# Patient Record
Sex: MALE | Race: WHITE | NOT HISPANIC OR LATINO | URBAN - METROPOLITAN AREA
[De-identification: names, ages, dates, MRNs, and addresses within clinical notes are randomized per-mention and may not be internally consistent; named-entity substitution may affect disease eponyms.]

---

## 2019-04-16 PROBLEM — 266435005 GASTRO-ESOPHAGEAL REFLUX DISEASE WITHOUT ESOPHAGITIS: Status: ACTIVE | Noted: 2019-04-16

## 2019-04-16 PROBLEM — 238131007 OVERWEIGHT: Status: ACTIVE | Noted: 2019-04-16

## 2019-04-16 PROBLEM — 68496003 POLYP OF COLON: Status: ACTIVE | Noted: 2019-04-16

## 2022-04-30 ENCOUNTER — TELEPHONE ENCOUNTER (OUTPATIENT)
Dept: URBAN - METROPOLITAN AREA CLINIC 121 | Facility: CLINIC | Age: 72
End: 2022-04-30

## 2022-05-01 ENCOUNTER — TELEPHONE ENCOUNTER (OUTPATIENT)
Dept: URBAN - METROPOLITAN AREA CLINIC 121 | Facility: CLINIC | Age: 72
End: 2022-05-01

## 2022-05-01 RX ORDER — SILDENAFIL CITRATE 20 MG/1
TABLET, FILM COATED ORAL
Status: ACTIVE | COMMUNITY
Start: 2019-05-03

## 2022-05-01 RX ORDER — TEA TREE OIL 100 %
SPRAY, NON-AEROSOL (ML) TOPICAL
Status: ACTIVE | COMMUNITY
Start: 2019-04-16

## 2022-05-01 RX ORDER — FAMOTIDINE 10 MG/1
TABLET, FILM COATED ORAL
Status: ACTIVE | COMMUNITY
Start: 2019-04-16

## 2022-05-01 RX ORDER — DANTROLENE SODIUM 20 MG/60ML
INJECTION, POWDER, LYOPHILIZED, FOR SOLUTION INTRAVENOUS
Status: ACTIVE | COMMUNITY
Start: 2019-04-16

## 2022-05-01 RX ORDER — ASPIRIN 81 MG/1
TABLET, DELAYED RELEASE ORAL
Status: ACTIVE | COMMUNITY
Start: 2019-04-16

## 2022-05-01 RX ORDER — DIAZEPAM 10 MG/1
TABLET ORAL
Status: ACTIVE | COMMUNITY
Start: 2019-04-17

## 2024-11-06 ENCOUNTER — LAB OUTSIDE AN ENCOUNTER (OUTPATIENT)
Dept: URBAN - METROPOLITAN AREA CLINIC 27 | Facility: CLINIC | Age: 74
End: 2024-11-06

## 2024-11-06 ENCOUNTER — DASHBOARD ENCOUNTERS (OUTPATIENT)
Age: 74
End: 2024-11-06

## 2024-11-06 ENCOUNTER — OFFICE VISIT (OUTPATIENT)
Dept: URBAN - METROPOLITAN AREA CLINIC 27 | Facility: CLINIC | Age: 74
End: 2024-11-06
Payer: MEDICARE

## 2024-11-06 VITALS
SYSTOLIC BLOOD PRESSURE: 130 MMHG | DIASTOLIC BLOOD PRESSURE: 86 MMHG | HEIGHT: 71 IN | WEIGHT: 193 LBS | BODY MASS INDEX: 27.02 KG/M2 | HEART RATE: 64 BPM

## 2024-11-06 DIAGNOSIS — I10 HYPERTENSION, ESSENTIAL: ICD-10-CM

## 2024-11-06 DIAGNOSIS — R13.19 ESOPHAGEAL DYSPHAGIA: ICD-10-CM

## 2024-11-06 DIAGNOSIS — K63.5 POLYP OF COLON: ICD-10-CM

## 2024-11-06 DIAGNOSIS — K21.9 GASTRO-ESOPHAGEAL REFLUX DISEASE WITHOUT ESOPHAGITIS: ICD-10-CM

## 2024-11-06 PROCEDURE — 99244 OFF/OP CNSLTJ NEW/EST MOD 40: CPT | Performed by: INTERNAL MEDICINE

## 2024-11-06 RX ORDER — FAMOTIDINE 10 MG/1
TABLET, FILM COATED ORAL
Status: ACTIVE | COMMUNITY
Start: 2019-04-16

## 2024-11-06 RX ORDER — DIAZEPAM 10 MG/1
TABLET ORAL
Status: ACTIVE | COMMUNITY
Start: 2019-04-17

## 2024-11-06 RX ORDER — ASPIRIN 81 MG/1
TABLET, DELAYED RELEASE ORAL
Status: ACTIVE | COMMUNITY
Start: 2019-04-16

## 2024-11-06 RX ORDER — TEA TREE OIL 100 %
SPRAY, NON-AEROSOL (ML) TOPICAL
Status: ACTIVE | COMMUNITY
Start: 2019-04-16

## 2024-11-06 RX ORDER — DANTROLENE SODIUM 20 MG/60ML
INJECTION, POWDER, LYOPHILIZED, FOR SOLUTION INTRAVENOUS
Status: ACTIVE | COMMUNITY
Start: 2019-04-16

## 2024-11-06 RX ORDER — SILDENAFIL CITRATE 20 MG/1
TABLET, FILM COATED ORAL
Status: ACTIVE | COMMUNITY
Start: 2019-05-03

## 2024-11-06 NOTE — PHYSICAL EXAM GASTROINTESTINAL
soft, nontender, nondistended [Joint Pain] : joint pain [Joint Stiffness] : joint stiffness [Negative] : Heme/Lymph

## 2024-11-06 NOTE — HPI-TODAY'S VISIT:
This is a 74-year-old male seen in consultation today for colon cancer screening evaluation.  His last colonoscopy was 5 years ago.he is a history of colon polyps and diverticulosis.  He also complains of occasional reflux about 2 times a week uses Pepcid 2 times a week occasional dysphagia to chicken salad and chicken.  His weight is stable he otherwise feels well.  He began statins 1 year ago and he became a little more constipated he has a bowel movement every day or every other day.  It does not bother him only occasionally straining.  He takes sildenafil 3 times a day for hypertension as well as hydrochlorothiazide.  Otherwise no complaints. Referred by Dr Berry.

## 2024-11-18 ENCOUNTER — TELEPHONE ENCOUNTER (OUTPATIENT)
Dept: URBAN - METROPOLITAN AREA CLINIC 27 | Facility: CLINIC | Age: 74
End: 2024-11-18

## 2024-12-05 ENCOUNTER — OFFICE VISIT (OUTPATIENT)
Dept: URBAN - METROPOLITAN AREA SURGERY CENTER 7 | Facility: SURGERY CENTER | Age: 74
End: 2024-12-05
Payer: MEDICARE

## 2024-12-05 DIAGNOSIS — K64.8 INTERNAL HEMORRHOIDS: ICD-10-CM

## 2024-12-05 DIAGNOSIS — Z86.0100 PERSONAL HISTORY OF COLONIC POLYPS: ICD-10-CM

## 2024-12-05 DIAGNOSIS — Z09 ENCOUNTER FOR FOLLOW-UP: ICD-10-CM

## 2024-12-05 DIAGNOSIS — Z09 ENCNTR FOR F/U EXAM AFT TRTMT FOR COND OTH THAN MALIG NEOPLM: ICD-10-CM

## 2024-12-05 DIAGNOSIS — Z86.0101 PERSONAL HISTORY OF ADENOMATOUS AND SERRATED COLON POLYPS: ICD-10-CM

## 2024-12-05 DIAGNOSIS — K57.30 DIVERTICULOSIS OF SIGMOID COLON: ICD-10-CM

## 2024-12-05 PROCEDURE — 0529F INTRVL 3/>YR PTS CLNSCP DOCD: CPT | Performed by: CLINIC/CENTER

## 2024-12-05 PROCEDURE — 00812 ANES LWR INTST SCR COLSC: CPT | Performed by: NURSE ANESTHETIST, CERTIFIED REGISTERED

## 2024-12-05 PROCEDURE — G0105 COLORECTAL SCRN; HI RISK IND: HCPCS | Performed by: CLINIC/CENTER

## 2024-12-05 PROCEDURE — G0105 COLORECTAL SCRN; HI RISK IND: HCPCS | Performed by: INTERNAL MEDICINE

## 2024-12-05 PROCEDURE — 0529F INTRVL 3/>YR PTS CLNSCP DOCD: CPT | Performed by: INTERNAL MEDICINE

## 2024-12-05 RX ORDER — DANTROLENE SODIUM 20 MG/60ML
INJECTION, POWDER, LYOPHILIZED, FOR SOLUTION INTRAVENOUS
Status: ACTIVE | COMMUNITY
Start: 2019-04-16

## 2024-12-05 RX ORDER — FAMOTIDINE 10 MG/1
TABLET, FILM COATED ORAL
Status: ACTIVE | COMMUNITY
Start: 2019-04-16

## 2024-12-05 RX ORDER — SILDENAFIL CITRATE 20 MG/1
TABLET, FILM COATED ORAL
Status: ACTIVE | COMMUNITY
Start: 2019-05-03

## 2024-12-05 RX ORDER — TEA TREE OIL 100 %
SPRAY, NON-AEROSOL (ML) TOPICAL
Status: ACTIVE | COMMUNITY
Start: 2019-04-16

## 2024-12-05 RX ORDER — ASPIRIN 81 MG/1
TABLET, DELAYED RELEASE ORAL
Status: ACTIVE | COMMUNITY
Start: 2019-04-16

## 2024-12-05 RX ORDER — DIAZEPAM 10 MG/1
TABLET ORAL
Status: ACTIVE | COMMUNITY
Start: 2019-04-17

## 2024-12-06 ENCOUNTER — OFFICE VISIT (OUTPATIENT)
Dept: URBAN - METROPOLITAN AREA CLINIC 27 | Facility: CLINIC | Age: 74
End: 2024-12-06